# Patient Record
Sex: MALE | Race: ASIAN | Employment: FULL TIME | URBAN - METROPOLITAN AREA
[De-identification: names, ages, dates, MRNs, and addresses within clinical notes are randomized per-mention and may not be internally consistent; named-entity substitution may affect disease eponyms.]

---

## 2020-05-30 ENCOUNTER — HOSPITAL ENCOUNTER (OUTPATIENT)
Facility: MEDICAL CENTER | Age: 30
End: 2020-05-30
Payer: COMMERCIAL

## 2020-06-04 LAB
SARS-COV-2 RNA SPEC QL NAA+PROBE: NOT DETECTED
SPECIMEN SOURCE: NORMAL

## 2020-11-08 ENCOUNTER — HOSPITAL ENCOUNTER (OUTPATIENT)
Facility: MEDICAL CENTER | Age: 30
End: 2020-11-08
Attending: PHYSICIAN ASSISTANT
Payer: COMMERCIAL

## 2020-11-08 ENCOUNTER — OFFICE VISIT (OUTPATIENT)
Dept: URGENT CARE | Facility: PHYSICIAN GROUP | Age: 30
End: 2020-11-08
Payer: COMMERCIAL

## 2020-11-08 VITALS
WEIGHT: 145 LBS | DIASTOLIC BLOOD PRESSURE: 64 MMHG | HEIGHT: 68 IN | BODY MASS INDEX: 21.98 KG/M2 | TEMPERATURE: 98.2 F | SYSTOLIC BLOOD PRESSURE: 118 MMHG | HEART RATE: 72 BPM | OXYGEN SATURATION: 96 % | RESPIRATION RATE: 16 BRPM

## 2020-11-08 DIAGNOSIS — Z20.822 CLOSE EXPOSURE TO COVID-19 VIRUS: ICD-10-CM

## 2020-11-08 DIAGNOSIS — R09.81 NASAL CONGESTION: ICD-10-CM

## 2020-11-08 DIAGNOSIS — R05.9 COUGH: ICD-10-CM

## 2020-11-08 DIAGNOSIS — M79.10 MYALGIA: ICD-10-CM

## 2020-11-08 PROCEDURE — 99204 OFFICE O/P NEW MOD 45 MIN: CPT | Mod: CS | Performed by: PHYSICIAN ASSISTANT

## 2020-11-08 PROCEDURE — U0003 INFECTIOUS AGENT DETECTION BY NUCLEIC ACID (DNA OR RNA); SEVERE ACUTE RESPIRATORY SYNDROME CORONAVIRUS 2 (SARS-COV-2) (CORONAVIRUS DISEASE [COVID-19]), AMPLIFIED PROBE TECHNIQUE, MAKING USE OF HIGH THROUGHPUT TECHNOLOGIES AS DESCRIBED BY CMS-2020-01-R: HCPCS

## 2020-11-08 ASSESSMENT — ENCOUNTER SYMPTOMS: COUGH: 1

## 2020-11-08 NOTE — PROGRESS NOTES
"Subjective:      Bakari Kaur is a 30 y.o. male who presents with Cough (low back pain x 2 days exposed to covid )    Medications:    • This patient does not have an active medication from one of the medication groupers.    Allergies: Patient has no known allergies.    Problem List: Bakari Kaur does not have a problem list on file.    Surgical History:  No past surgical history on file.    Past Social Hx: Bakari Kaur  reports that he has quit smoking. He has never used smokeless tobacco.     Past Family Hx:  Bakari Kaur family history is not on file. Not pertinent to today's visit.      Problem list, medications, and allergies reviewed by myself today in Epic.         Patient presents with:  Cough: low back pain x 2 days exposed to covid . Pt has not taken anything for his symptoms.        Cough  This is a new problem. The current episode started in the past 7 days. The problem has been gradually worsening. The problem occurs every few minutes. The cough is non-productive. Associated symptoms include myalgias. Pertinent negatives include no chest pain, fever, headaches, nasal congestion, postnasal drip or shortness of breath. Exacerbated by: exertion. He has tried nothing for the symptoms. The treatment provided no relief. There is no history of asthma, COPD or pneumonia.       Review of Systems   Constitutional: Negative for fever.   HENT: Negative for postnasal drip.    Respiratory: Positive for cough. Negative for sputum production and shortness of breath.    Cardiovascular: Negative for chest pain.   Musculoskeletal: Positive for back pain and myalgias.   Neurological: Negative for headaches.   All other systems reviewed and are negative.         Objective:     /64 (BP Location: Left arm, Patient Position: Sitting, BP Cuff Size: Adult)   Pulse 72   Temp 36.8 °C (98.2 °F) (Temporal)   Resp 16   Ht 1.727 m (5' 8\")   Wt 65.8 kg (145 lb)   SpO2 96%   BMI 22.05 kg/m²      Physical Exam  Vitals " signs and nursing note reviewed.   Constitutional:       General: He is awake. He is not in acute distress.     Appearance: Normal appearance. He is well-developed, well-groomed and normal weight. He is not ill-appearing or toxic-appearing.   HENT:      Head: Normocephalic and atraumatic.      Right Ear: Tympanic membrane normal.      Left Ear: Tympanic membrane normal.      Nose: Mucosal edema and rhinorrhea present.      Mouth/Throat:      Lips: Pink.      Mouth: Mucous membranes are moist.      Pharynx: Uvula midline. No posterior oropharyngeal erythema or uvula swelling.   Eyes:      Extraocular Movements: Extraocular movements intact.      Conjunctiva/sclera: Conjunctivae normal.      Pupils: Pupils are equal, round, and reactive to light.   Neck:      Musculoskeletal: Normal range of motion and neck supple.   Cardiovascular:      Rate and Rhythm: Normal rate and regular rhythm.      Pulses: Normal pulses.      Heart sounds: Normal heart sounds.   Pulmonary:      Effort: Pulmonary effort is normal. No respiratory distress.      Breath sounds: No decreased breath sounds, wheezing, rhonchi or rales.   Abdominal:      Palpations: Abdomen is soft.   Musculoskeletal: Normal range of motion.   Lymphadenopathy:      Cervical: No cervical adenopathy.   Skin:     General: Skin is warm and dry.      Capillary Refill: Capillary refill takes less than 2 seconds.   Neurological:      General: No focal deficit present.      Mental Status: He is alert and oriented to person, place, and time.      Gait: Gait normal.   Psychiatric:         Mood and Affect: Mood normal.         Behavior: Behavior is cooperative.                 Assessment/Plan:     1. Nasal congestion  COVID/SARS CoV-2 PCR   2. Cough  COVID/SARS CoV-2 PCR   3. Myalgia  COVID/SARS CoV-2 PCR   4. Close exposure to COVID-19 virus  COVID/SARS CoV-2 PCR     Per protocol for PUI/ISO patients, the patient was evaluated by me while I was wearing PPE.  Per CDC  guidelines, patient has been instructed to self quarantine at home for at least 10 days from onset of symptoms and at least 3 full days after resolution of fever/respiratory symptoms.  PT verbalized agreement to do so.      Motrin/Advil/Ibuprophen 600 mg every 6 hours as needed for pain or fever.    PT will be notified by mychart with results, pt verbalized acknowledgement and agreement with this notification method.   PT should follow up with PCP in 1-2 days for re-evaluation if symptoms have not improved.  Discussed red flags and reasons to return to UC or ED.  Pt and/or family verbalized understanding of diagnosis and follow up instructions and was offered informational handout on diagnosis.  PT discharged.

## 2020-11-08 NOTE — PATIENT INSTRUCTIONS
INSTRUCTIONS FOR COVID-19 OR ANY OTHER INFECTIOUS RESPIRATORY ILLNESSES    The Centers for Disease Control and Prevention (CDC) states that early indications for COVID-19 include cough, shortness of breath, difficulty breathing, or at least two of the following symptoms: chills, shaking with chills, muscle pain, headache, sore throat, and loss of taste or smell. Symptoms can range from mild to severe and may appear up to two weeks after exposure to the virus.    The practice of self-isolation and quarantine helps protect the public and your family by  preventing exposure to people who have or may have a contagious disease. Please follow the prevention steps below as based on CDC guidelines:    WHEN TO STOP ISOLATION: Persons with COVID-19 or any other infectious respiratory illness who have symptoms and were advised to care for themselves at home may discontinue home isolation under the following conditions:  · At least 24 hours have passed since recovery defined as resolution of fever without the use of fever-reducing medications; AND,  · Improvement in respiratory symptoms (e.g., cough, shortness of breath); AND,  · At least 10 days have passed since symptoms first appeared and have had no subsequent illness.    MONITOR YOUR SYMPTOMS: If your illness is worsening, seek prompt medical attention. If you have a medical emergency and need to call 911, notify the dispatch personnel that you have, or are being evaluated for confirmed or suspected COVID-19 or another infectious respiratory illness. Wear a facemask if possible.    ACTIVITY RESTRICTION: restrict activities outside your home, except for getting medical care. Do not go to work, school, or public areas. Avoid using public transportation, ride-sharing, or taxis.    SCHEDULED MEDICAL APPOINTMENTS: Notify your provider that you have, or are being evaluated for, confirmed or suspected COVID-19 or another infectious respiratory. This will help the healthcare  provider’s office safely take care of you and keep other people from getting exposed or infected.    FACEMASKS, when to wear: Anytime you are away from your home or around other people or pets. If you are unable to wear one, maintain a minimum of 6 feet distancing from others.    LIVING ENVIRONMENT: Stay in a separate room from other people and pets. If possible, use a separate bathroom, have someone else care for your pets and avoid sharing household items. Any items used should be washed thoroughly with soap and water. Clean all “high-touch” surfaces every day. Use a household cleaning spray or wipe, according to the label instructions. High touch surfaces include (but are not limited to) counters, tabletops, doorknobs, bathroom fixtures, toilets, phones, keyboards, tablets, and bedside tables.     HAND WASHING: Frequently wash hands with soap and water for at least 20 seconds,  especially after blowing your nose, coughing, or sneezing; going to the bathroom; before and after interacting with pets; and before and after eating or preparing food. If hands are visibly dirty use soap and water. If soap and water are not available, use an alcohol-based hand  with at least 60% alcohol. Avoid touching your eyes, nose, and mouth with unwashed hands. Cover your coughs and sneezes with a tissue. Throw used tissues in a lined trash can. Immediately wash your hands.    ACTIVE/FACILITATED SELF-MONITORING: Follow instructions provided by your local health department or health professionals, as appropriate. When working with your local health department check their available hours.    [facility]   Phone Number   [facility] [phone]   [facility] [place] [phone]   [place] [address]   [place] [phone]     IF YOU HAVE CONFIRMED POSITIVE COVID-19:    Those who have completely recovered from COVID-19 may have immune-boosting antibodies in their plasma--called “convalescent plasma”--that could be  used to treat critically ill COVID19 patients.    [facility] is excited to begin working with [employer] on collecting convalescent plasma from  people who have recovered from COVID-19 as part of a program to treat patients infected with the virus. This FDA-approved “emergency investigational new drug” is a special blood product containing antibodies that may give patients an extra boost to fight the virus.    To be eligible to donate convalescent plasma, you must have a prior COVID-19 diagnosis documented by a laboratory test (or a positive test result for SARS-CoV-2 antibodies) and meet additional eligibility requirements.    If you are interested in donating convalescent plasma or have any additional questions, please contact the [facility] Convalescent Plasma  at [phone] or via e-mail at [email or web address].

## 2020-11-09 DIAGNOSIS — R09.81 NASAL CONGESTION: ICD-10-CM

## 2020-11-09 DIAGNOSIS — Z20.822 CLOSE EXPOSURE TO COVID-19 VIRUS: ICD-10-CM

## 2020-11-09 DIAGNOSIS — M79.10 MYALGIA: ICD-10-CM

## 2020-11-09 DIAGNOSIS — R05.9 COUGH: ICD-10-CM

## 2020-11-09 LAB
COVID ORDER STATUS COVID19: NORMAL
SARS-COV-2 RNA RESP QL NAA+PROBE: DETECTED
SPECIMEN SOURCE: ABNORMAL

## 2020-11-12 ENCOUNTER — TELEPHONE (OUTPATIENT)
Dept: URGENT CARE | Facility: CLINIC | Age: 30
End: 2020-11-12

## 2020-11-12 DIAGNOSIS — U07.1 COVID-19 VIRUS INFECTION: ICD-10-CM

## 2020-11-12 NOTE — TELEPHONE ENCOUNTER
I spoke with patient to make sure he knew that his Covid test results were positive, they were released to his [facility].  Patient was appreciative for the phone call.

## 2020-11-15 ASSESSMENT — COPD QUESTIONNAIRES: COPD: 0

## 2020-11-15 ASSESSMENT — ENCOUNTER SYMPTOMS
SPUTUM PRODUCTION: 0
SHORTNESS OF BREATH: 0
BACK PAIN: 1
HEADACHES: 0
MYALGIAS: 1
FEVER: 0

## 2021-09-04 ENCOUNTER — APPOINTMENT (OUTPATIENT)
Dept: RADIOLOGY | Facility: MEDICAL CENTER | Age: 31
End: 2021-09-04
Attending: EMERGENCY MEDICINE
Payer: COMMERCIAL

## 2021-09-04 ENCOUNTER — HOSPITAL ENCOUNTER (EMERGENCY)
Facility: MEDICAL CENTER | Age: 31
End: 2021-09-04
Attending: EMERGENCY MEDICINE | Admitting: EMERGENCY MEDICINE
Payer: COMMERCIAL

## 2021-09-04 VITALS
OXYGEN SATURATION: 93 % | TEMPERATURE: 97.6 F | HEART RATE: 75 BPM | WEIGHT: 160 LBS | HEIGHT: 67 IN | DIASTOLIC BLOOD PRESSURE: 64 MMHG | RESPIRATION RATE: 16 BRPM | SYSTOLIC BLOOD PRESSURE: 102 MMHG | BODY MASS INDEX: 25.11 KG/M2
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
## 2021-09-04 PROCEDURE — 71045 X-RAY EXAM CHEST 1 VIEW: CPT

## 2021-09-04 PROCEDURE — 99284 EMERGENCY DEPT VISIT MOD MDM: CPT

## 2021-09-04 NOTE — ED NOTES
Pt given discharge instructions/home care instructions explained, pt verbalized understanding of instructions given/pt understands the importance of follow up, pt ambulatory in PD's care.     No
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
## 2024-04-06 ENCOUNTER — HOSPITAL ENCOUNTER (OUTPATIENT)
Dept: RADIOLOGY | Facility: MEDICAL CENTER | Age: 34
End: 2024-04-06
Payer: COMMERCIAL